# Patient Record
Sex: FEMALE | Race: WHITE | Employment: FULL TIME | URBAN - METROPOLITAN AREA
[De-identification: names, ages, dates, MRNs, and addresses within clinical notes are randomized per-mention and may not be internally consistent; named-entity substitution may affect disease eponyms.]

---

## 2020-01-13 ENCOUNTER — HOSPITAL ENCOUNTER (EMERGENCY)
Age: 60
Discharge: HOME | End: 2020-01-13
Attending: EMERGENCY MEDICINE
Payer: COMMERCIAL

## 2020-01-13 ENCOUNTER — APPOINTMENT (OUTPATIENT)
Dept: GENERAL RADIOLOGY | Age: 60
End: 2020-01-13
Payer: COMMERCIAL

## 2020-01-13 VITALS
RESPIRATION RATE: 16 BRPM | TEMPERATURE: 98 F | HEART RATE: 87 BPM | SYSTOLIC BLOOD PRESSURE: 183 MMHG | OXYGEN SATURATION: 100 % | DIASTOLIC BLOOD PRESSURE: 85 MMHG

## 2020-01-13 LAB
APPEARANCE FLUID: NORMAL
CELL COUNT FLUID TYPE: NORMAL
CLOT EVALUATION: NORMAL
COLOR FLUID: NORMAL
CRYSTAL CMT 2: NORMAL
CRYSTALS, FLUID: NORMAL
LYMPHOCYTES, BODY FLUID: 6 %
MONOCYTE, FLUID: 8 %
NEUTROPHIL, FLUID: 86 %
NUCLEATED CELLS FLUID: 880 /CUMM
NUMBER OF CELLS COUNTED FLUID: 100
RBC FLUID: 1640 /CUMM
REASON FOR REJECTION: NORMAL
REJECTED TEST: NORMAL
SOURCE BODY FLUID: NORMAL

## 2020-01-13 PROCEDURE — 87070 CULTURE OTHR SPECIMN AEROBIC: CPT

## 2020-01-13 PROCEDURE — 73630 X-RAY EXAM OF FOOT: CPT

## 2020-01-13 PROCEDURE — 89060 EXAM SYNOVIAL FLUID CRYSTALS: CPT

## 2020-01-13 PROCEDURE — 20605 DRAIN/INJ JOINT/BURSA W/O US: CPT

## 2020-01-13 PROCEDURE — 73610 X-RAY EXAM OF ANKLE: CPT

## 2020-01-13 PROCEDURE — 36415 COLL VENOUS BLD VENIPUNCTURE: CPT

## 2020-01-13 PROCEDURE — 99283 EMERGENCY DEPT VISIT LOW MDM: CPT

## 2020-01-13 PROCEDURE — 87205 SMEAR GRAM STAIN: CPT

## 2020-01-13 PROCEDURE — 89051 BODY FLUID CELL COUNT: CPT

## 2020-01-13 RX ORDER — LIDOCAINE HYDROCHLORIDE AND EPINEPHRINE 10; 10 MG/ML; UG/ML
INJECTION, SOLUTION INFILTRATION; PERINEURAL
Status: DISCONTINUED
Start: 2020-01-13 | End: 2020-01-13 | Stop reason: HOSPADM

## 2020-01-13 ASSESSMENT — ENCOUNTER SYMPTOMS
VOMITING: 0
COLOR CHANGE: 0
NAUSEA: 0
CHEST TIGHTNESS: 0
SHORTNESS OF BREATH: 0

## 2020-01-13 NOTE — ED PROVIDER NOTES
[address] ENCOUNTER          ATTENDING PHYSICIAN NOTE       Date of evaluation: 1/13/2020    Chief Complaint     Foot Pain (left foot; no known injury)      History of Present Illness     Lee Rahman is a 63 y.o. woman who presents with left ankle pain and swelling. She said the pain began on Friday, and absolutely denies any twisting, trauma, or injury of any kind. She has not fallen, and she did not bang the ankle up against anything. She reports mild to moderate intensity pain that has worsened over the last several days. Is localized to the left ankle and does not spread. She reports no redness or warmth around the ankle. She denies any fevers or chills. Over the course of the last 24 to 36 hours, she started to notice more swelling in the area, and has had trouble bearing weight on that ankle. When asked repeatedly, she vehemently denies any report of trauma, injury, insect bites, or any injections to the area. Review of Systems     Review of Systems   Constitutional: Negative for chills and fever. Respiratory: Negative for chest tightness and shortness of breath. Cardiovascular: Negative for chest pain. Gastrointestinal: Negative for nausea and vomiting. Musculoskeletal: Positive for arthralgias and joint swelling. Skin: Negative for color change, pallor, rash and wound. Allergic/Immunologic: Negative for environmental allergies and food allergies. Neurological: Negative for dizziness, light-headedness and headaches. Past Medical, Surgical, Family, and Social History     She has a past medical history of Hypertension. She has no past surgical history on file. Her family history is not on file. She reports that she has never smoked. She has never used smokeless tobacco. She reports previous alcohol use. She reports that she does not use drugs.     Medications     Previous Medications    No medications on file       Allergies     She has No

## 2020-02-03 LAB
BODY FLUID CULTURE, STERILE: NORMAL
GRAM STAIN RESULT: NORMAL